# Patient Record
Sex: FEMALE | Race: WHITE | NOT HISPANIC OR LATINO | URBAN - METROPOLITAN AREA
[De-identification: names, ages, dates, MRNs, and addresses within clinical notes are randomized per-mention and may not be internally consistent; named-entity substitution may affect disease eponyms.]

---

## 2018-08-02 ENCOUNTER — OFFICE VISIT (OUTPATIENT)
Dept: URBAN - METROPOLITAN AREA CLINIC 193 | Facility: CLINIC | Age: 83
End: 2018-08-02
Payer: COMMERCIAL

## 2018-08-02 PROCEDURE — 92015 DETERMINE REFRACTIVE STATE: CPT | Performed by: OPTOMETRIST

## 2018-08-02 PROCEDURE — 92012 INTRM OPH EXAM EST PATIENT: CPT | Performed by: OPTOMETRIST

## 2018-08-02 ASSESSMENT — VISUAL ACUITY
OS: 20/30+
OD: 20/40+

## 2018-08-08 ENCOUNTER — TESTING ONLY (OUTPATIENT)
Dept: URBAN - METROPOLITAN AREA CLINIC 193 | Facility: CLINIC | Age: 83
End: 2018-08-08
Payer: COMMERCIAL

## 2018-08-08 PROCEDURE — 92083 EXTENDED VISUAL FIELD XM: CPT | Performed by: OPTOMETRIST

## 2018-08-09 ENCOUNTER — OFFICE VISIT (OUTPATIENT)
Dept: URBAN - METROPOLITAN AREA CLINIC 193 | Facility: CLINIC | Age: 83
End: 2018-08-09
Payer: COMMERCIAL

## 2018-08-09 PROCEDURE — 92014 COMPRE OPH EXAM EST PT 1/>: CPT | Performed by: OPHTHALMOLOGY

## 2018-08-09 PROCEDURE — 92133 CPTRZD OPH DX IMG PST SGM ON: CPT | Performed by: OPHTHALMOLOGY

## 2018-08-09 ASSESSMENT — INTRAOCULAR PRESSURE
OD: 17
OS: 18

## 2018-08-09 NOTE — IMPRESSION/PLAN
Impression: Nonexudative age-related macular degeneration, bilateral, early dry stage: H35.3131. Intermediate OD? Plan: AMD: Discussed condition in detail. AMD and AREDS education given. Amsler grid given and explained how and when to use. Call if symptoms worsen. Will continue to monitor.

## 2018-08-09 NOTE — IMPRESSION/PLAN
Impression: Diagnosis: Primary open-angle glaucoma, bilateral, moderate stage. Code: A34.1164. OU 
IOP OU controlled on current regimen. No changes needed. no significant diurnal variation. OCT stable compared to last. VF stable compared to last. Plan: Continue Latanoprost QHS OU,  Alphagan P BID OU.  Timolol BID OU

## 2018-09-07 ENCOUNTER — TESTING ONLY (OUTPATIENT)
Dept: URBAN - METROPOLITAN AREA CLINIC 193 | Facility: CLINIC | Age: 83
End: 2018-09-07

## 2018-09-07 DIAGNOSIS — H52.223 REGULAR ASTIGMATISM, BILATERAL: Primary | ICD-10-CM

## 2018-09-07 PROCEDURE — V2799 MISC VISION ITEM OR SERVICE: HCPCS | Performed by: OPTOMETRIST

## 2018-09-07 ASSESSMENT — VISUAL ACUITY
OD: 20/40+
OS: 20/30-

## 2018-09-07 NOTE — IMPRESSION/PLAN
Impression: Regular astigmatism, bilateral: H52.223. Rx check. OD over minused? Plan: A NEW glasses prescription has been discussed and generated. Patient to call with any concerns.  return in PRN

## 2018-09-28 ENCOUNTER — TESTING ONLY (OUTPATIENT)
Dept: URBAN - METROPOLITAN AREA CLINIC 193 | Facility: CLINIC | Age: 83
End: 2018-09-28

## 2018-09-28 PROCEDURE — V2799 MISC VISION ITEM OR SERVICE: HCPCS | Performed by: OPTOMETRIST

## 2018-09-28 NOTE — IMPRESSION/PLAN
Impression: Regular astigmatism, bilateral: H52.223. Rx check. Difficulty with near vision.  told pt that SRx is wrong. +2.50 add confirmed for working distance. Almost leaned toward +2.25. Not 2.75. Frame sits high and seg is low with minimal space for near add. Plan: A NEW glasses prescription has NOT been discussed and generated. Informed pt that frame needs to come down and seg height up to allow more space for NVA. Consider NVO.

## 2018-11-29 ENCOUNTER — OFFICE VISIT (OUTPATIENT)
Dept: URBAN - METROPOLITAN AREA CLINIC 71 | Facility: CLINIC | Age: 83
End: 2018-11-29
Payer: COMMERCIAL

## 2018-11-29 DIAGNOSIS — Z96.1 PRESENCE OF INTRAOCULAR LENS: ICD-10-CM

## 2018-11-29 DIAGNOSIS — H40.1132 PRIMARY OPEN-ANGLE GLAUCOMA, BILATERAL, MODERATE STAGE: Primary | ICD-10-CM

## 2018-11-29 PROCEDURE — 99213 OFFICE O/P EST LOW 20 MIN: CPT | Performed by: OPHTHALMOLOGY

## 2018-11-29 ASSESSMENT — INTRAOCULAR PRESSURE
OS: 15
OD: 15

## 2018-11-29 NOTE — IMPRESSION/PLAN
Impression: Diagnosis: Primary open-angle glaucoma, bilateral, moderate stage. Code: R21.3214. OU 
IOP OU controlled on current regimen. No changes needed. no significant diurnal variation. Plan: Continue Latanoprost QHS OU,  Alphagan P BID OU.  Timolol BID OU

## 2018-12-14 ENCOUNTER — OFFICE VISIT (OUTPATIENT)
Dept: URBAN - METROPOLITAN AREA CLINIC 71 | Facility: CLINIC | Age: 83
End: 2018-12-14
Payer: COMMERCIAL

## 2018-12-14 PROCEDURE — 92015 DETERMINE REFRACTIVE STATE: CPT | Performed by: OPTOMETRIST

## 2018-12-14 PROCEDURE — 92012 INTRM OPH EXAM EST PATIENT: CPT | Performed by: OPTOMETRIST

## 2018-12-14 ASSESSMENT — VISUAL ACUITY
OS: 20/30+
OD: 20/30-

## 2018-12-14 ASSESSMENT — INTRAOCULAR PRESSURE
OD: 15
OS: 15

## 2018-12-14 NOTE — IMPRESSION/PLAN
Impression: Diagnosis: Primary open-angle glaucoma, bilateral, moderate stage. Code: I33.1946. OU Plan: Continue Latanoprost QHS OU,  Alphagan P BID OU. Timolol BID OU.  Continue care with Dr. Nakul Gunter

## 2018-12-14 NOTE — IMPRESSION/PLAN
Impression: Nonexudative age-related macular degeneration, bilateral, early dry stage: H35.3131. Intermediate OD? Taking eye vitamins. Not using Amsler. Plan: AMD: Discussed condition in detail. Discussed AREDS and Amsler. Re-explained Amsler and AREDS. Call if symptoms worsen. Will continue to monitor.

## 2018-12-14 NOTE — IMPRESSION/PLAN
Impression: Regular astigmatism, bilateral: H52.223. DVA okay with glasses but NVA too difficult with glasses. XP' PP minimal. PAL seg seemingly still too low. Plan: A glasses prescription has been discussed and generated. Patient to call with any concerns. Advise higher seg height. Advise NVO glasses.

## 2018-12-14 NOTE — IMPRESSION/PLAN
Impression: Diagnosis: Presence of intraocular lens. Code: Z96.1. Side: OU. s/p YAG OD Plan: Continue to monitor. Cataract surgery not likely the cause of limited vision OD (or OS). More likely AMD and glaucoma.

## 2019-03-21 ENCOUNTER — OFFICE VISIT (OUTPATIENT)
Dept: URBAN - METROPOLITAN AREA CLINIC 71 | Facility: CLINIC | Age: 84
End: 2019-03-21
Payer: MEDICARE

## 2019-03-21 DIAGNOSIS — H35.3131 NONEXUDATIVE AGE-RELATED MACULAR DEGENERATION, BILATERAL, EARLY DRY STAGE: ICD-10-CM

## 2019-03-21 PROCEDURE — 99213 OFFICE O/P EST LOW 20 MIN: CPT | Performed by: OPHTHALMOLOGY

## 2019-03-21 RX ORDER — BRIMONIDINE TARTRATE 1 MG/ML
0.1 % SOLUTION/ DROPS OPHTHALMIC
Qty: 3 | Refills: 2 | Status: INACTIVE
Start: 2019-03-21 | End: 2019-03-21

## 2019-03-21 RX ORDER — LATANOPROST 50 UG/ML
0.005 % SOLUTION OPHTHALMIC
Qty: 1 | Refills: 1 | Status: INACTIVE
Start: 2019-03-21 | End: 2019-06-13

## 2019-03-21 RX ORDER — TIMOLOL MALEATE 5 MG/ML
0.5 % SOLUTION/ DROPS OPHTHALMIC
Qty: 3 | Refills: 2 | Status: INACTIVE
Start: 2019-03-21 | End: 2019-06-13

## 2019-03-21 ASSESSMENT — INTRAOCULAR PRESSURE
OS: 15
OD: 16

## 2019-03-21 NOTE — IMPRESSION/PLAN
Impression: Diagnosis: Primary open-angle glaucoma, bilateral, moderate stage. Code: P12.2061. OU 
IOP OU controlled on current regimen. No changes needed. no significant diurnal variation. Plan: Continue Latanoprost QHS OU,  Alphagan P BID OU. Timolol BID OU  Needs updated tests.

## 2019-05-29 ENCOUNTER — TESTING ONLY (OUTPATIENT)
Dept: URBAN - METROPOLITAN AREA CLINIC 71 | Facility: CLINIC | Age: 84
End: 2019-05-29
Payer: MEDICARE

## 2019-05-29 PROCEDURE — 92083 EXTENDED VISUAL FIELD XM: CPT | Performed by: OPHTHALMOLOGY

## 2019-06-13 ENCOUNTER — OFFICE VISIT (OUTPATIENT)
Dept: URBAN - METROPOLITAN AREA CLINIC 71 | Facility: CLINIC | Age: 84
End: 2019-06-13
Payer: MEDICARE

## 2019-06-13 PROCEDURE — 92133 CPTRZD OPH DX IMG PST SGM ON: CPT | Performed by: OPHTHALMOLOGY

## 2019-06-13 PROCEDURE — 92014 COMPRE OPH EXAM EST PT 1/>: CPT | Performed by: OPHTHALMOLOGY

## 2019-06-13 RX ORDER — TIMOLOL MALEATE 5 MG/ML
0.5 % SOLUTION/ DROPS OPHTHALMIC
Qty: 3 | Refills: 2 | Status: INACTIVE
Start: 2019-06-13 | End: 2020-07-08

## 2019-06-13 ASSESSMENT — INTRAOCULAR PRESSURE
OD: 14
OS: 14

## 2019-06-13 NOTE — IMPRESSION/PLAN
Impression: Nonexudative age-related macular degeneration, bilateral, early dry stage: H35.3131. Plan: Will continue to observe condition and or symptoms.  Use of vitamins may reduce progression of ARMD.

## 2019-06-13 NOTE — IMPRESSION/PLAN
Impression: Diagnosis: Primary open-angle glaucoma, bilateral, moderate stage. Code: Q30.7536. OU 
IOP OU controlled on current regimen. No changes needed. no significant diurnal variation. OCT stable compared to last. VF showing progression. Plan: Continue to monitor. Continue  Alphagan P BID OU. Advised patient Timolol should be taken BID OU. Discussed additional treatment options Rocklatan  vs SLT OU. Discussed r/b/a of SLT. Patient would like to drops first. D/C Latanoprost, will switch to Rocklatan QHS OU.

## 2019-07-17 ENCOUNTER — OFFICE VISIT (OUTPATIENT)
Dept: URBAN - METROPOLITAN AREA CLINIC 71 | Facility: CLINIC | Age: 84
End: 2019-07-17
Payer: MEDICARE

## 2019-07-17 DIAGNOSIS — H04.123 DRY EYE SYNDROME OF BILATERAL LACRIMAL GLANDS: ICD-10-CM

## 2019-07-17 PROCEDURE — 99213 OFFICE O/P EST LOW 20 MIN: CPT | Performed by: OPHTHALMOLOGY

## 2019-07-17 RX ORDER — NETARSUDIL AND LATANOPROST OPHTHALMIC SOLUTION, 0.02%/0.005% .2; .05 MG/ML; MG/ML
SOLUTION/ DROPS OPHTHALMIC; TOPICAL
Qty: 3 | Refills: 3 | Status: INACTIVE
Start: 2019-07-17 | End: 2019-07-17

## 2019-07-17 RX ORDER — NETARSUDIL AND LATANOPROST OPHTHALMIC SOLUTION, 0.02%/0.005% .2; .05 MG/ML; MG/ML
SOLUTION/ DROPS OPHTHALMIC; TOPICAL
Qty: 3 | Refills: 3 | Status: INACTIVE
Start: 2019-07-17 | End: 2019-11-25

## 2019-07-17 ASSESSMENT — INTRAOCULAR PRESSURE
OS: 14
OS: 11
OD: 18
OD: 20

## 2019-07-17 NOTE — IMPRESSION/PLAN
Impression: Dry eye syndrome of bilateral lacrimal glands: H04.123. OU. Plan: Dry eyes account for the patient's complaints. There is no evidence of permanent changes to the cornea. Explained condition does not have a cure and will need artificial tears for maintenance. Recommend AT's 3-4 times per day OU more if needed.

## 2019-07-17 NOTE — IMPRESSION/PLAN
Impression: Diagnosis: Primary open-angle glaucoma, bilateral, moderate stage. Code: L85.5195. OU 
IOP OD high, IOP OS improved with Rocklatan. Only started in OS
no significant diurnal variation. Plan: Continue to monitor. Recommend patient start Rocklatan in OD. Continue Alphagan P BID OU, Timolol BID OU. Rocklatan QHS OU.

## 2019-11-07 ENCOUNTER — OFFICE VISIT (OUTPATIENT)
Dept: URBAN - METROPOLITAN AREA CLINIC 71 | Facility: CLINIC | Age: 84
End: 2019-11-07
Payer: MEDICARE

## 2019-11-07 PROCEDURE — 99213 OFFICE O/P EST LOW 20 MIN: CPT | Performed by: OPTOMETRIST

## 2019-11-07 ASSESSMENT — INTRAOCULAR PRESSURE
OS: 15
OD: 17

## 2019-11-07 NOTE — IMPRESSION/PLAN
Impression: Diagnosis: Primary open-angle glaucoma, bilateral, moderate stage. Code: D11.9007. OU 
IOP still too high OD, IOP OS improved with Rocklatan. no significant diurnal variation. Plan: Continue to monitor. continue Rocklatan qhs OU. Continue Alphagan P BID OU, Timolol BID OU. refer to Dr Gold Hernandez for [facility] ([facility]) consult and possible SLT OD. Task to Dr. Gold Hernandez. Per 11/7/19 task from Dr. Gold Hernandez, he approves of plan.

## 2019-11-07 NOTE — IMPRESSION/PLAN
Impression: Nonexudative age-related macular degeneration, bilateral, early dry stage: H35.3131. taking eye vitamins. NOT looking at [address] grid. Pt reports worsening vision OU not supported by [address] testing today. Plan: discussed AMD and Amsler grid. Amsler grid given and explained again for q1wk use.

## 2019-11-12 ENCOUNTER — OFFICE VISIT (OUTPATIENT)
Dept: URBAN - METROPOLITAN AREA CLINIC 71 | Facility: CLINIC | Age: 84
End: 2019-11-12
Payer: MEDICARE

## 2019-11-12 PROCEDURE — 92134 CPTRZ OPH DX IMG PST SGM RTA: CPT | Performed by: OPTOMETRIST

## 2019-11-12 PROCEDURE — 99212 OFFICE O/P EST SF 10 MIN: CPT | Performed by: OPTOMETRIST

## 2019-11-12 RX ORDER — BRIMONIDINE TARTRATE 2 MG/ML
0.2 % SOLUTION/ DROPS OPHTHALMIC
Qty: 1 | Refills: 1 | Status: INACTIVE
Start: 2019-11-12 | End: 2020-07-08

## 2019-11-12 NOTE — IMPRESSION/PLAN
Impression: Nonexudative age-related macular degeneration, bilateral, early dry stage: H35.3131. taking eye vitamins. NOT looking at [address] grid. Taking Lutein vitamins for many years. Pt reports worsening vision OU not supported by [place] testing today. OCT mac 11/12/2019: No SRF/IRF OU. Plan: discussed AMD and Amsler grid. Amsler grid given and explained again for q1wk use. Continue AREDS. Repeat OCT mac yearly.

## 2019-11-20 ENCOUNTER — OFFICE VISIT (OUTPATIENT)
Dept: URBAN - METROPOLITAN AREA CLINIC 71 | Facility: CLINIC | Age: 84
End: 2019-11-20
Payer: MEDICARE

## 2019-11-20 PROCEDURE — 99213 OFFICE O/P EST LOW 20 MIN: CPT | Performed by: OPHTHALMOLOGY

## 2019-11-20 ASSESSMENT — INTRAOCULAR PRESSURE
OS: 13
OD: 17

## 2019-11-20 NOTE — IMPRESSION/PLAN
Impression: Diagnosis: Primary open-angle glaucoma, bilateral, moderate stage. Code: J76.5522. OU 
no significant diurnal variation. IOP OU ok today, back in good range. tests reviewed Plan: Continue to monitor. Continue Rocklatan qhs OU. Continue Alphagan P BID OU, Timolol BID OU. No additional treatment recommended at this time.

## 2019-11-25 ENCOUNTER — OFFICE VISIT (OUTPATIENT)
Dept: URBAN - METROPOLITAN AREA CLINIC 71 | Facility: CLINIC | Age: 84
End: 2019-11-25
Payer: COMMERCIAL

## 2019-11-25 PROCEDURE — 92012 INTRM OPH EXAM EST PATIENT: CPT | Performed by: OPTOMETRIST

## 2019-11-25 RX ORDER — NETARSUDIL AND LATANOPROST OPHTHALMIC SOLUTION, 0.02%/0.005% .2; .05 MG/ML; MG/ML
SOLUTION/ DROPS OPHTHALMIC; TOPICAL
Qty: 3 | Refills: 3 | Status: INACTIVE
Start: 2019-11-25 | End: 2019-12-04

## 2019-11-25 ASSESSMENT — INTRAOCULAR PRESSURE
OS: 9
OD: 11

## 2019-11-25 ASSESSMENT — VISUAL ACUITY
OD: 20/40
OS: 20/25

## 2019-11-25 NOTE — IMPRESSION/PLAN
Impression: Dry eye syndrome of bilateral lacrimal glands: H04.123. Plan: use ATs more often. Call if worsens.

## 2019-11-25 NOTE — IMPRESSION/PLAN
Impression: Diagnosis: Primary open-angle glaucoma, bilateral, moderate stage. Code: G85.7498. OU 
no significant diurnal variation. Plan: Continue to monitor. Continue Rocklatan qhs OU. Continue Alphagan P BID OU, Timolol BID OU.  IOP check sched 2/2020